# Patient Record
(demographics unavailable — no encounter records)

---

## 2025-07-09 NOTE — PLAN
[TextEntry] : Instill ear drops as prescribed, avoid water submersion for 7 days. Ibuprofen as needed for discomfort. Rest and hydrate well.  Return for new or worsening symptoms.

## 2025-07-09 NOTE — PHYSICAL EXAM
[Clear Rhinorrhea] : no rhinorrhea [Mucoid Discharge] : no mucoid discharge [NL] : warm, clear [FreeTextEntry3] : left ear canal with erythema, and mild inflammation and purulent discharge, no swelling around ear, mild discomfort with manipulation of pinna, right cerumen impaction, TM clear and canal clear post removal of cerumen

## 2025-07-09 NOTE — HISTORY OF PRESENT ILLNESS
[de-identified] : left ear pain for 1 day [FreeTextEntry6] : ear pain in left ear for 1 days, went in water yesterday  no drainage from ear no pain in right ear, no muffling   No URI symptoms, no fever  no respiratory distress, no wheezing or dyspnea. No rashes, no lethargy, no myalgia. No abdominal pain, no vomiting or diarrhea, stooling normally. Voiding normally, eating and drinking well. No concern for dehydration.   No sick contacts. No recent travel. No other concerns at this time

## 2025-07-09 NOTE — HISTORY OF PRESENT ILLNESS
[de-identified] : left ear pain for 1 day [FreeTextEntry6] : ear pain in left ear for 1 days, went in water yesterday  no drainage from ear no pain in right ear, no muffling   No URI symptoms, no fever  no respiratory distress, no wheezing or dyspnea. No rashes, no lethargy, no myalgia. No abdominal pain, no vomiting or diarrhea, stooling normally. Voiding normally, eating and drinking well. No concern for dehydration.   No sick contacts. No recent travel. No other concerns at this time